# Patient Record
Sex: FEMALE | Race: OTHER | NOT HISPANIC OR LATINO | Employment: FULL TIME | URBAN - METROPOLITAN AREA
[De-identification: names, ages, dates, MRNs, and addresses within clinical notes are randomized per-mention and may not be internally consistent; named-entity substitution may affect disease eponyms.]

---

## 2024-02-03 ENCOUNTER — OFFICE VISIT (OUTPATIENT)
Dept: URGENT CARE | Facility: CLINIC | Age: 24
End: 2024-02-03
Payer: COMMERCIAL

## 2024-02-03 VITALS
BODY MASS INDEX: 19.62 KG/M2 | OXYGEN SATURATION: 96 % | SYSTOLIC BLOOD PRESSURE: 124 MMHG | HEIGHT: 67 IN | WEIGHT: 125 LBS | HEART RATE: 82 BPM | RESPIRATION RATE: 17 BRPM | TEMPERATURE: 100 F | DIASTOLIC BLOOD PRESSURE: 76 MMHG

## 2024-02-03 DIAGNOSIS — R05.8 PRODUCTIVE COUGH: ICD-10-CM

## 2024-02-03 DIAGNOSIS — J06.9 UPPER RESPIRATORY INFECTION WITH COUGH AND CONGESTION: Primary | ICD-10-CM

## 2024-02-03 DIAGNOSIS — R09.81 NASAL CONGESTION: ICD-10-CM

## 2024-02-03 LAB
CTP QC/QA: YES
CTP QC/QA: YES
POC MOLECULAR INFLUENZA A AGN: NEGATIVE
POC MOLECULAR INFLUENZA B AGN: NEGATIVE
SARS-COV-2 AG RESP QL IA.RAPID: NEGATIVE

## 2024-02-03 PROCEDURE — 87502 INFLUENZA DNA AMP PROBE: CPT | Mod: QW,S$GLB,, | Performed by: NURSE PRACTITIONER

## 2024-02-03 PROCEDURE — 87811 SARS-COV-2 COVID19 W/OPTIC: CPT | Mod: QW,S$GLB,, | Performed by: NURSE PRACTITIONER

## 2024-02-03 PROCEDURE — 99203 OFFICE O/P NEW LOW 30 MIN: CPT | Mod: GT,S$GLB,, | Performed by: NURSE PRACTITIONER

## 2024-02-03 RX ORDER — FLUTICASONE PROPIONATE 50 MCG
1 SPRAY, SUSPENSION (ML) NASAL DAILY
Qty: 9.9 ML | Refills: 0 | Status: SHIPPED | OUTPATIENT
Start: 2024-02-03

## 2024-02-03 RX ORDER — BROMPHENIRAMINE MALEATE, PSEUDOEPHEDRINE HYDROCHLORIDE, AND DEXTROMETHORPHAN HYDROBROMIDE 2; 30; 10 MG/5ML; MG/5ML; MG/5ML
10 SYRUP ORAL EVERY 4 HOURS PRN
Qty: 120 ML | Refills: 0 | Status: SHIPPED | OUTPATIENT
Start: 2024-02-03

## 2024-02-03 RX ORDER — PROMETHAZINE HYDROCHLORIDE AND DEXTROMETHORPHAN HYDROBROMIDE 6.25; 15 MG/5ML; MG/5ML
10 SYRUP ORAL NIGHTLY PRN
Qty: 70 ML | Refills: 0 | Status: SHIPPED | OUTPATIENT
Start: 2024-02-03

## 2024-02-03 RX ORDER — AZELASTINE 1 MG/ML
1 SPRAY, METERED NASAL 2 TIMES DAILY
Qty: 30 ML | Refills: 0 | Status: SHIPPED | OUTPATIENT
Start: 2024-02-03 | End: 2025-02-02

## 2024-02-03 NOTE — LETTER
February 3, 2024      Urgent Care - [address]  [facility] [address]  Phone: [phone]  Fax: [phone]       Patient: Austin Handley   YOB: 2000  Date of Visit: 02/03/2024    To Whom It May Concern:    Louie Handley  was at [facility] on 02/03/2024. The patient may return to work/school on 02/05/2024 with no restrictions. If you have any questions or concerns, or if I can be of further assistance, please do not hesitate to contact me.    Sincerely,    Mckenzie Duenas MA

## 2024-02-03 NOTE — LETTER
February 4, 2024      Urgent Care - [address]  [facility] [address]  Phone: [phone]  Fax: [phone]       Patient: Austin Handley   YOB: 2000  Date of Visit: 02/04/2024    To Whom It May Concern:    Louie Handley  was at [facility] on 02/04/2024. The patient may return to work/school on 2/05/2024 with no restrictions. If you have any questions or concerns, or if I can be of further assistance, please do not hesitate to contact me.    Sincerely,    Mckenzie Duenas MA

## 2024-02-03 NOTE — PATIENT INSTRUCTIONS
Please follow up with your primary care provider within 2-5 days if your signs and symptoms have not resolved or worsen.     If your condition worsens or fails to improve we recommend that you receive another evaluation at the emergency room immediately or contact your primary medical clinic to discuss your concerns.   You must understand that you have received an Urgent Care treatment only and that you may be released before all of your medical problems are known or treated. You, the patient, will arrange for follow up care as instructed.       You have tested negative for COVID on our Binax test. As a follow up the recommendation is if you have symptoms within the first 7 days to retest within 48 hours. I you have NO SYMPTONS then you should test every 48 hours two more times.    Take PLAIN Mucinex twice daily and drinks lots of water!      General Instructions for Upper Respiratory Infection (URI):     Alternate Tylenol and Ibuprofen every 3 hrs for fever, pain and inflammation.   Avoid NSAIDs (Ibuprofen, Aleve, Motrin, Aspirin) if you are pregnant, or have advanced kidney disease or history of stomach ulcers/bleeding.     Sore throat/Post Nasal Drip:  Salt water gargles, chloraseptic spray, lozenges, or cough drops   Honey/lemon water or warm tea   Cepachol   Zantac will help if there is reflux from the post nasal drip and helpful to take at night     Sinus Congestion/Runny nose:  Zyrtec/Claritin/Allegra during the day and Benadryl at night as needed  Mucinex, Dayquil, or Coricidin   If you DO NOT have Hypertension (high blood pressure) or any history of palpitations, it is ok to take over the counter Sudafed or Mucinex D or Allegra-D or Claritin-D or Zyrtec-D.  If you do take one of the above, it is ok to combine that with plain over the counter Mucinex or Allegra or Claritin or Zyrtec. If, for example, you are taking Zyrtec -D, you can combine that with Mucinex, but not Mucinex-D. If you are taking Mucinex-D,  you can combine that with plain Allegra or Claritin or Zyrtec.  If you DO have Hypertension or palpitations, it is safe to take Coricidin HBP for relief of sinus symptoms.  Nasal saline spray reduces inflammation and dryness  Flonase OTC or Nasacort OTC to help decrease inflammation in nasal turbinates and allow sinuses to drain  Warm face compresses/hot showers as often as you can to open sinuses and allow to drain.   Vicks vapor rub and/or humidifier at night  Cold-eeze helps to reduce the duration of URI symptoms if taken early  Elderberry, Emergen-C, and/or Zinc to reduce duration of viral URI symptoms    Cough:  Robitussin or Delsym as needed  Cough drops  Vicks vapor rub and/or humidifier at night       Rest as much as you can     Your symptoms are likely viral and will typically last 7-10 days, maybe longer depending on how it affects your body.  You are contagious until day 5-7, so minimize contact with others to reduce the spread to others and stay home from work or school as we discussed. Dehydration is preventable but is one of the main reasons why you will feel so badly. Drink pedialyte, gatorade or propel. Stay hydrated.  Antibiotics are not needed unless a complication( such as Otitis Media, Bacterial sinus infection or pneumonia) develops. Taking antibiotics for Flu/Cold is not supported by evidence-based medicine and can expose you to unnecessary side effects of the medication, such as anaphylaxis, yeast infection and leads to antibiotic resistance.     Please follow up with your primary care provider within 5-7 days if your signs and symptoms have not resolved or worsen.  If your condition worsens or fails to improve we recommend that you receive another evaluation at the emergency  room immediately or contact your primary medical clinic to discuss your concerns.  You must understand that you have received an Urgent Care treatment only and that you may be released before all of  your medical  problems are known or treated. You, the patient, will arrange for follow up care as instructed.    Go to Emergency Room immediately if you experience any:  Chest pain, shortness of breath, wheezing or difficulty breathing,  Severe headache, face, neck or ear pain,  New rash,  Fever over 101.5º F (38.6 C) for more than three days,  Confusion, behavior change or seizure,  Severe weakness or dizziness or passing out

## 2024-02-05 ENCOUNTER — CLINICAL SUPPORT (OUTPATIENT)
Dept: URGENT CARE | Facility: CLINIC | Age: 24
End: 2024-02-05
Payer: COMMERCIAL

## 2024-02-05 VITALS
HEIGHT: 67 IN | WEIGHT: 125 LBS | SYSTOLIC BLOOD PRESSURE: 111 MMHG | DIASTOLIC BLOOD PRESSURE: 81 MMHG | TEMPERATURE: 98 F | BODY MASS INDEX: 19.62 KG/M2 | OXYGEN SATURATION: 96 % | HEART RATE: 78 BPM | RESPIRATION RATE: 20 BRPM

## 2024-02-05 DIAGNOSIS — J18.9 COMMUNITY ACQUIRED PNEUMONIA OF RIGHT LUNG, UNSPECIFIED PART OF LUNG: Primary | ICD-10-CM

## 2024-02-05 DIAGNOSIS — R05.9 COUGH, UNSPECIFIED TYPE: ICD-10-CM

## 2024-02-05 PROCEDURE — 71046 X-RAY EXAM CHEST 2 VIEWS: CPT | Mod: S$GLB,,, | Performed by: RADIOLOGY

## 2024-02-05 PROCEDURE — 99214 OFFICE O/P EST MOD 30 MIN: CPT | Mod: S$GLB,,, | Performed by: FAMILY MEDICINE

## 2024-02-05 RX ORDER — AMOXICILLIN 500 MG/1
TABLET, FILM COATED ORAL
Qty: 42 TABLET | Refills: 0 | Status: SHIPPED | OUTPATIENT
Start: 2024-02-05

## 2024-02-05 RX ORDER — AZITHROMYCIN 250 MG/1
TABLET, FILM COATED ORAL
Qty: 6 TABLET | Refills: 0 | Status: SHIPPED | OUTPATIENT
Start: 2024-02-05

## 2024-02-05 NOTE — LETTER
February 5, 2024      Urgent Care - [address]  [facility] [address]  Phone: [phone]  Fax: [phone]       Patient: Austin Handley   YOB: 2000  Date of Visit: 02/05/2024    To Whom It May Concern:    Louie Handley  was at [facility] on 02/05/2024 with a pneumonia.  She may return to work school on 02/09/2024 as long as symptoms are generally improved.      Sincerely,       Preethi Nelson MD

## 2024-02-05 NOTE — PROGRESS NOTES
"Subjective:      Patient ID: Austin Handley is a 23 y.o. female.    Vitals:  height is 5' 7" (1.702 m) and weight is 56.7 kg (125 lb). Her oral temperature is 98.3 °F (36.8 °C). Her blood pressure is 111/81 and her pulse is 78. Her respiration is 20 and oxygen saturation is 96%.     Chief Complaint: Cough    23-year-old seen via [facility] 2 days ago with negative flu test and negative COVID test.  She is continued to run fever and cough, both worse since time of office visit.  Fever this morning was 101.2.  Cough is productive of gray opaque thick sputum.  History of exercise-induced asthma as a child but not problematic since.  She is not herself wheeze.  This morning had upset stomach and vomited.        Cough  This is a recurrent problem. The current episode started in the past 7 days. The problem has been gradually worsening. The problem occurs every few minutes. The cough is Productive of sputum. Associated symptoms include chills, a fever and headaches. Pertinent negatives include no chest pain, ear congestion or ear pain. Treatments tried: pt was given Rx on last visit. The treatment provided no relief.       Constitution: Positive for chills and fever.   HENT:  Negative for ear pain.    Cardiovascular:  Negative for chest pain.   Respiratory:  Positive for cough.    Neurological:  Positive for headaches.      Objective:     Physical Exam   Constitutional: She is oriented to person, place, and time. She appears well-developed.  Non-toxic appearance. No distress.      Comments:Lying down.  Awakens easily.  Cooperative.  Appears fatigued     HENT:   Head: Normocephalic and atraumatic.   Ears:   Right Ear: Tympanic membrane and external ear normal.   Left Ear: Tympanic membrane and external ear normal.   Nose: No rhinorrhea or congestion.   Cardiovascular: Normal rate and regular rhythm.   Pulmonary/Chest: Effort normal. No stridor. No respiratory distress. She has no wheezes. She has no rhonchi.         Comments: " Right basilar rales are noted.  No wheezing.    Neurological: She is alert and oriented to person, place, and time.   Skin: Skin is not diaphoretic.   Psychiatric: Her behavior is normal. Thought content normal.   Nursing note and vitals reviewed.      Assessment:     1. Community acquired pneumonia of right lung, unspecified part of lung    2. Cough, unspecified type        Plan:       Community acquired pneumonia of right lung, unspecified part of lung  -     amoxicillin (AMOXIL) 500 MG Tab; Two tablets 3 times daily for 7 days  Dispense: 42 tablet; Refill: 0  -     azithromycin (Z-KALI) 250 MG tablet; Take 2 tablets by mouth on day 1; Take 1 tablet by mouth on days 2-5  Dispense: 6 tablet; Refill: 0    Cough, unspecified type  -     XR CHEST PA AND LATERAL; Future; Expected date: 02/05/2024        IF YOU ARE NOT FEELING CONSIDERABLY BETTER AFTER 36-48 HOURS OF ANTIBIOTICS, RECHECK HERE OR GO TO THE EMERGENCY ROOM.    Make sure that you follow up with your primary care doctor in the next 2-5 days if needed .  Go to or return to Urgent Care if signs or symptoms change and certainly if you have worsening and/or severe symptoms go to the nearest emergency department for further evaluation.

## 2024-02-06 NOTE — PATIENT INSTRUCTIONS
IF YOU ARE NOT FEELING CONSIDERABLY BETTER AFTER 36-48 HOURS OF ANTIBIOTICS, RECHECK HERE OR GO TO THE EMERGENCY ROOM.    Make sure that you follow up with your primary care doctor in the next 2-5 days if needed .  Go to or return to Urgent Care if signs or symptoms change and certainly if you have worsening and/or severe symptoms go to the nearest emergency department for further evaluation.